# Patient Record
(demographics unavailable — no encounter records)

---

## 2024-11-07 NOTE — HISTORY OF PRESENT ILLNESS
Chronic Pain Consult Note      Plan:     A comprehensive pain management plan may consist of the following: Testing, Therapy, Medications, Interventions, Consults, and Follow up.    Right ilioinguinal neuropathy  Scheduled for right ilioinguinal nerve block with ultrasound guidance  Chronic pain syndrome  Receiving initial course of Norco 10 mg 3 times daily from PCP.  No reference or discussion surrounding taking over this medication at this time.  Patient and for member's goal is treatment without use of medication.  Happy to take her medication if needed going forward.  Patient reports benefit from amitriptyline.  However current documentation notes nortriptyline dosing.    General Recommendations: The pain condition that the patient suffers from is best treated with a multidisciplinary approach that involves an increase in physical activity to prevent de-conditioning and worsening of the pain cycle, as well as psychological counseling (formal and/or informal) to address the co morbid psychological effects of pain. Treatment will often involve judicious use of pain medications and interventional procedures to decrease the pain, allowing the patient to participate in the physical activity that will ultimately produce long-lasting pain reductions. The goal of the multidisciplinary approach is to return the patient to a higher level of overall function and to restore their ability to perform activities of daily living.      Referring Provider: Bisi Cortez MD  Assessment:      Chief Complaint: No chief complaint on file.      Vidal Akers is a 69 y.o. male [facility] the [facility] diagnoses:    Diagnosis:  No diagnosis found.      Subjective:      HPI:  Mr. Akers is seen in consultation at the request of Bisi Cortez MD for evaluation and recommendations regarding the above diagnoses and the below HPI.    HPI on10/21/24: 69-year-old male presents for evaluation treatment  [FreeTextEntry1] : fay  [de-identified] : fay on right arm few weeks getting better  on Abx (Doxy + augmentin) for bronchitis -- noticing prickly feeling on right arm but no rash

## 2024-11-07 NOTE — ASSESSMENT
[FreeTextEntry1] : Traumatic macular purpura possibly superimposed on lentigo appears resolving okay to monitor  Recommend rtc if any rash appears at area of itching on arm otherwise rtc for TBSE

## 2025-01-16 NOTE — COUNSELING
[Fall prevention counseling provided] : Fall prevention counseling provided [Behavioral health counseling provided] : Behavioral health counseling provided [Engage in a relaxing activity] : Engage in a relaxing activity [AUDIT-C Screening administered and reviewed] : AUDIT-C Screening administered and reviewed [None] : None

## 2025-01-17 NOTE — PHYSICAL EXAM
[No Acute Distress] : no acute distress [Well Nourished] : well nourished [Well Developed] : well developed [Well-Appearing] : well-appearing [Normal Voice/Communication] : normal voice/communication [Normal Sclera/Conjunctiva] : normal sclera/conjunctiva [PERRL] : pupils equal round and reactive to light [EOMI] : extraocular movements intact [Fundoscopic Exam Performed] : fundoscopic ~T exam ~C was performed [Normal Outer Ear/Nose] : the outer ears and nose were normal in appearance [Normal Oropharynx] : the oropharynx was normal [Normal TMs] : both tympanic membranes were normal [Normal Nasal Mucosa] : the nasal mucosa was normal [No JVD] : no jugular venous distention [No Lymphadenopathy] : no lymphadenopathy [Supple] : supple [Thyroid Normal, No Nodules] : the thyroid was normal and there were no nodules present [No Respiratory Distress] : no respiratory distress  [No Accessory Muscle Use] : no accessory muscle use [Clear to Auscultation] : lungs were clear to auscultation bilaterally [Normal Rate] : normal rate  [Regular Rhythm] : with a regular rhythm [Normal S1, S2] : normal S1 and S2 [No Carotid Bruits] : no carotid bruits [No Abdominal Bruit] : a ~M bruit was not heard ~T in the abdomen [No Varicosities] : no varicosities [Pedal Pulses Present] : the pedal pulses are present [No Edema] : there was no peripheral edema [No Palpable Aorta] : no palpable aorta [No Extremity Clubbing/Cyanosis] : no extremity clubbing/cyanosis [No Axillary Lymphadenopathy] : no axillary lymphadenopathy [Soft] : abdomen soft [Non Tender] : non-tender [Non-distended] : non-distended [No Masses] : no abdominal mass palpated [No HSM] : no HSM [Normal Bowel Sounds] : normal bowel sounds [No Hernias] : no hernias [Normal Posterior Cervical Nodes] : no posterior cervical lymphadenopathy [Normal Anterior Cervical Nodes] : no anterior cervical lymphadenopathy [No CVA Tenderness] : no CVA  tenderness [No Spinal Tenderness] : no spinal tenderness [No Joint Swelling] : no joint swelling [Grossly Normal Strength/Tone] : grossly normal strength/tone [Coordination Grossly Intact] : coordination grossly intact [No Focal Deficits] : no focal deficits [Normal Gait] : normal gait [Speech Grossly Normal] : speech grossly normal [Normal Affect] : the affect was normal [Alert and Oriented x3] : oriented to person, place, and time [Normal Mood] : the mood was normal [Normal Insight/Judgement] : insight and judgment were intact [de-identified] : obese [de-identified] : 2/6 LEW RSB radiates to neck [de-identified] : lichen planus

## 2025-01-17 NOTE — HEALTH RISK ASSESSMENT
[Very Good] : ~his/her~  mood as very good [Yes] : Yes [4 or more  times a week (4 pts)] : 4 or more  times a week (4 points) [1 or 2 (0 pts)] : 1 or 2 (0 points) [Never (0 pts)] : Never (0 points) [No] : In the past 12 months have you used drugs other than those required for medical reasons? No [No falls in past year] : Patient reported no falls in the past year [Little interest or pleasure doing things] : 1) Little interest or pleasure doing things [0] : 1) Little interest or pleasure doing things: Not at all (0) [Feeling down, depressed, or hopeless] : 2) Feeling down, depressed, or hopeless [1] : 2) Feeling down, depressed, or hopeless for several days (1) [PHQ-2 Negative - No further assessment needed] : PHQ-2 Negative - No further assessment needed [Former] : Former [5-9] : 5-9 [No Retinopathy] : No retinopathy [Patient reported colonoscopy was normal] : Patient reported colonoscopy was normal [None] : None [With Family] : lives with family [Employed] : employed [College] : College [] :  [Sexually Active] : sexually active [Feels Safe at Home] : Feels safe at home [Fully functional (bathing, dressing, toileting, transferring, walking, feeding)] : Fully functional (bathing, dressing, toileting, transferring, walking, feeding) [Fully functional (using the telephone, shopping, preparing meals, housekeeping, doing laundry, using] : Fully functional and needs no help or supervision to perform IADLs (using the telephone, shopping, preparing meals, housekeeping, doing laundry, using transportation, managing medications and managing finances) [Reports normal functional visual acuity (ie: able to read med bottle)] : Reports normal functional visual acuity [] :  [de-identified] : Derm; cardiology [Audit-CScore] : 4 [de-identified] : golf; walking [de-identified] : low fat [[facility]] : 12 [[id number]] : 1 [LowDoseCTScan] : NA [EyeExamDate] : today [Change in mental status noted] : No change in mental status noted [Language] : denies difficulty with language [Behavior] : denies difficulty with behavior [Learning/Retaining New Information] : denies difficulty learning/retaining new information [Handling Complex Tasks] : denies difficulty handling complex tasks [Reasoning] : denies difficulty with reasoning [Spatial Ability and Orientation] : denies difficulty with spatial ability and orientation [High Risk Behavior] : no high risk behavior [Reports changes in hearing] : Reports no changes in hearing [Reports changes in vision] : Reports no changes in vision [Reports changes in dental health] : Reports no changes in dental health [MammogramDate] : NA [PapSmearDate] : NA [BoneDensityDate] : NA [ColonoscopyDate] : 3/2023 [ColonoscopyComments] : one polyp; diverticuli [HepatitisCDate] : 2/2015 [HepatitisCComments] : negative

## 2025-01-17 NOTE — PHYSICAL EXAM
[No Acute Distress] : no acute distress [Well Nourished] : well nourished [Well Developed] : well developed [Well-Appearing] : well-appearing [Normal Voice/Communication] : normal voice/communication [Normal Sclera/Conjunctiva] : normal sclera/conjunctiva [PERRL] : pupils equal round and reactive to light [EOMI] : extraocular movements intact [Fundoscopic Exam Performed] : fundoscopic ~T exam ~C was performed [Normal Outer Ear/Nose] : the outer ears and nose were normal in appearance [Normal Oropharynx] : the oropharynx was normal [Normal TMs] : both tympanic membranes were normal [Normal Nasal Mucosa] : the nasal mucosa was normal [No JVD] : no jugular venous distention [No Lymphadenopathy] : no lymphadenopathy [Supple] : supple [Thyroid Normal, No Nodules] : the thyroid was normal and there were no nodules present [No Respiratory Distress] : no respiratory distress  [No Accessory Muscle Use] : no accessory muscle use [Clear to Auscultation] : lungs were clear to auscultation bilaterally [Normal Rate] : normal rate  [Regular Rhythm] : with a regular rhythm [Normal S1, S2] : normal S1 and S2 [No Carotid Bruits] : no carotid bruits [No Abdominal Bruit] : a ~M bruit was not heard ~T in the abdomen [No Varicosities] : no varicosities [Pedal Pulses Present] : the pedal pulses are present [No Edema] : there was no peripheral edema [No Palpable Aorta] : no palpable aorta [No Extremity Clubbing/Cyanosis] : no extremity clubbing/cyanosis [No Axillary Lymphadenopathy] : no axillary lymphadenopathy [Soft] : abdomen soft [Non Tender] : non-tender [Non-distended] : non-distended [No Masses] : no abdominal mass palpated [No HSM] : no HSM [Normal Bowel Sounds] : normal bowel sounds [No Hernias] : no hernias [Normal Posterior Cervical Nodes] : no posterior cervical lymphadenopathy [Normal Anterior Cervical Nodes] : no anterior cervical lymphadenopathy [No CVA Tenderness] : no CVA  tenderness [No Spinal Tenderness] : no spinal tenderness [No Joint Swelling] : no joint swelling [Grossly Normal Strength/Tone] : grossly normal strength/tone [Coordination Grossly Intact] : coordination grossly intact [No Focal Deficits] : no focal deficits [Normal Gait] : normal gait [Speech Grossly Normal] : speech grossly normal [Normal Affect] : the affect was normal [Alert and Oriented x3] : oriented to person, place, and time [Normal Mood] : the mood was normal [Normal Insight/Judgement] : insight and judgment were intact [de-identified] : obese [de-identified] : 2/6 LWE RSB radiates to neck [de-identified] : lichen planus

## 2025-01-17 NOTE — HEALTH RISK ASSESSMENT
[Very Good] : ~his/her~  mood as very good [Yes] : Yes [4 or more  times a week (4 pts)] : 4 or more  times a week (4 points) [1 or 2 (0 pts)] : 1 or 2 (0 points) [Never (0 pts)] : Never (0 points) [No] : In the past 12 months have you used drugs other than those required for medical reasons? No [No falls in past year] : Patient reported no falls in the past year [Little interest or pleasure doing things] : 1) Little interest or pleasure doing things [0] : 1) Little interest or pleasure doing things: Not at all (0) [Feeling down, depressed, or hopeless] : 2) Feeling down, depressed, or hopeless [1] : 2) Feeling down, depressed, or hopeless for several days (1) [PHQ-2 Negative - No further assessment needed] : PHQ-2 Negative - No further assessment needed [Former] : Former [5-9] : 5-9 [No Retinopathy] : No retinopathy [Patient reported colonoscopy was normal] : Patient reported colonoscopy was normal [None] : None [With Family] : lives with family [Employed] : employed [College] : College [] :  [Sexually Active] : sexually active [Feels Safe at Home] : Feels safe at home [Fully functional (bathing, dressing, toileting, transferring, walking, feeding)] : Fully functional (bathing, dressing, toileting, transferring, walking, feeding) [Fully functional (using the telephone, shopping, preparing meals, housekeeping, doing laundry, using] : Fully functional and needs no help or supervision to perform IADLs (using the telephone, shopping, preparing meals, housekeeping, doing laundry, using transportation, managing medications and managing finances) [Reports normal functional visual acuity (ie: able to read med bottle)] : Reports normal functional visual acuity [] :  [de-identified] : Derm; cardiology [Audit-CScore] : 4 [de-identified] : golf; walking [de-identified] : low fat [[facility]] : 12 [[id number]] : 1 [LowDoseCTScan] : NA [EyeExamDate] : today [Change in mental status noted] : No change in mental status noted [Language] : denies difficulty with language [Behavior] : denies difficulty with behavior [Learning/Retaining New Information] : denies difficulty learning/retaining new information [Handling Complex Tasks] : denies difficulty handling complex tasks [Reasoning] : denies difficulty with reasoning [Spatial Ability and Orientation] : denies difficulty with spatial ability and orientation [High Risk Behavior] : no high risk behavior [Reports changes in hearing] : Reports no changes in hearing [Reports changes in vision] : Reports no changes in vision [Reports changes in dental health] : Reports no changes in dental health [MammogramDate] : NA [PapSmearDate] : NA [BoneDensityDate] : NA [ColonoscopyDate] : 3/2023 [ColonoscopyComments] : one polyp; diverticuli [HepatitisCDate] : 2/2015 [HepatitisCComments] : negative

## 2025-01-17 NOTE — ASSESSMENT
[FreeTextEntry1] : 75-year-old male with history of hyperlipidemia, diverticulosis, metabolic liver disease, BPPV, palpitations AISHA, prostate cancer, and osteopenia here for wellness visit. His wife passed away last year with endometrial cancer.  Still has some palpitations but better. Extensive w/u for palps by [facility].  No evidence of ischemia but ablation had been recommended but patient was not a candidate. Has been on Flecainide and beta blocker which have been increased. No chest pain, SOA, PND or orthopnea. Has oral appliance for AISHA.   Had echo which was unchanged.  Has h/o gout treated with colchicine.  Weight 261 and has gained.  Currently doing well. Going to [place] tomorrow.  Told at  that he needs aortic valve replacement since its now severe. No SOA or chest pain.  Has increased eating and drinking. Other complaints include feeling tired and needing daytime naps, constipation on Dulcolax, left knee pain,  vertigo and bruising bilateral upper arms.   Problem #1 Cardiac: Followed by [facility]. On flecanide and metoprolol. Audible murmur on exam.  Needs valve replacement. Problem #2 chronic low back pain: Patient doing well on p.r.n. non-steroidal medications. Occasionally uses Flexeril. Problem #3 diverticulosis: currently doing well without recurrence or recent exacerbation. Problem #4 hyperlipidemia: On atorvastatin. Last lipid levels okay as were LFTs. Problem #5 history of prostate cancer: PSA has been undetectable. Problem #6 Vertigo: one episode.  Seen by ENT. No sequelae.  Problem #7 Derm: mild bruising on upper arms. Do not look pathologic. Check platelets.  Problem #8 Psych: Wife dies in the last year of endometrial cancer.  Describes reactive depression but managing well. PHQ 2 is 1.  Problem #9 health care maintenance: Check CBC, CMP, LFTs, PSA and hemoglobin A1c. Had flu vaccine, RSV and recent COVID booster.

## 2025-01-17 NOTE — HISTORY OF PRESENT ILLNESS
[de-identified] : 75-year-old male with history of hyperlipidemia, diverticulosis, metabolic liver disease, BPPV, palpitations AISHA, prostate cancer, and osteopenia here for wellness visit. His wife passed away last year with endometrial cancer.  Still has some palpitations but better. Extensive w/u for palps by [facility].  No evidence of ischemia but ablation had been recommended but patient was not a candidate. Has been on Flecainide and beta blocker which have been increased. No chest pain, SOA, PND or orthopnea. Has oral appliance for AISHA.   Had echo which was unchanged.  Has h/o gout treated with colchicine.  Weight 261 and has gained.  Currently doing well. Going to [place] tomorrow.  Tols at  that he needs aortic valve replacement since its now severe. No SOA or chest pain.  Has increased eating and drinking. Other complaints include feeling tired and needing daytime naps, constipation on Dulcolax, left knee pain,   vertigo and bruising bilateral upper arms.

## 2025-01-17 NOTE — ASSESSMENT
[FreeTextEntry1] : 75-year-old male with history of hyperlipidemia, diverticulosis, metabolic liver disease, BPPV, palpitations AISHA, prostate cancer, and osteopenia here for wellness visit. His wife passed away last year with endometrial cancer.  Still has some palpitations but better. Extensive w/u for palps by [facility].  No evidence of ischemia but ablation had been recommended but patient was not a candidate. Has been on Flecainide and beta blocker which have been increased. No chest pain, SOA, PND or orthopnea. Has oral appliance for AISHA.   Had echo which was unchanged.  Has h/o gout treated with colchicine.  Weight 261 and has gained.  Currently doing well. Going to [place] tomorrow.  Told at  that he needs aortic valve replacement since its now severe. No SOA or chest pain.  Has increased eating and drinking. Other complaints include feeling tired and needing daytime naps, constipation on Dulcolax, left knee pain,  vertigo and bruising bilateral upper arms.   Problem #1 Cardiac: Followed by [facility]. On flecanide and metoprolol. Audible murmur on exam.  Needs valve replacement. Problem #2 chronic low back pain: Patient doing well on p.r.n. non-steroidal medications. Occasionally uses Flexeril. Problem #3 diverticulosis: currently doing well without recurrence or recent exacerbation. Problem #4 hyperlipidemia: On atorvastatin. Last lipid levels okay as were LFTs. Problem #5 history of prostate cancer: PSA has been undetectable. Problem #6 Vertigo: one episode.  Seen by ENT. No sequelae.  Problem #7 Derm: mild bruising on upper arms. Do not look pathologic. Check platelets.  Problem #8 Psych: Wife dies in the last year of endometrial cancer.  Describes reactive depression but managing well. PHQ 2 is 1.  Problem #9 health care maintenance: Check CBC, CMP, LFTs, PSA and hemoglobin A1c. Had flu vaccine, RSV and recent COVID booster.

## 2025-01-17 NOTE — REVIEW OF SYSTEMS
[Palpitations] : palpitations [Back Pain] : back pain [Negative] : Heme/Lymph [Recent Change In Weight] : ~T no recent weight change [Vision Problems] : no vision problems [Chest Pain] : no chest pain [Lower Ext Edema] : no lower extremity edema [Orthopena] : no orthopnea [Diarrhea] : no diarrhea [Vomiting] : no vomiting [Melena] : no melena [Skin Rash] : no skin rash

## 2025-01-17 NOTE — HISTORY OF PRESENT ILLNESS
[de-identified] : 75-year-old male with history of hyperlipidemia, diverticulosis, metabolic liver disease, BPPV, palpitations AISHA, prostate cancer, and osteopenia here for wellness visit. His wife passed away last year with endometrial cancer.  Still has some palpitations but better. Extensive w/u for palps by [facility].  No evidence of ischemia but ablation had been recommended but patient was not a candidate. Has been on Flecainide and beta blocker which have been increased. No chest pain, SOA, PND or orthopnea. Has oral appliance for AISHA.   Had echo which was unchanged.  Has h/o gout treated with colchicine.  Weight 261 and has gained.  Currently doing well. Going to [place] tomorrow.  Tols at  that he needs aortic valve replacement since its now severe. No SOA or chest pain.  Has increased eating and drinking. Other complaints include feeling tired and needing daytime naps, constipation on Dulcolax, left knee pain,   vertigo and bruising bilateral upper arms.